# Patient Record
Sex: FEMALE | Race: WHITE | NOT HISPANIC OR LATINO | Employment: FULL TIME | URBAN - METROPOLITAN AREA
[De-identification: names, ages, dates, MRNs, and addresses within clinical notes are randomized per-mention and may not be internally consistent; named-entity substitution may affect disease eponyms.]

---

## 2018-11-07 ENCOUNTER — ANNUAL EXAM (OUTPATIENT)
Dept: OBGYN CLINIC | Facility: CLINIC | Age: 48
End: 2018-11-07
Payer: COMMERCIAL

## 2018-11-07 VITALS
HEIGHT: 65 IN | DIASTOLIC BLOOD PRESSURE: 70 MMHG | BODY MASS INDEX: 24.16 KG/M2 | SYSTOLIC BLOOD PRESSURE: 118 MMHG | WEIGHT: 145 LBS

## 2018-11-07 DIAGNOSIS — Z12.31 ENCOUNTER FOR SCREENING MAMMOGRAM FOR MALIGNANT NEOPLASM OF BREAST: ICD-10-CM

## 2018-11-07 DIAGNOSIS — Z01.419 ENCNTR FOR GYN EXAM (GENERAL) (ROUTINE) W/O ABN FINDINGS: ICD-10-CM

## 2018-11-07 PROCEDURE — 99396 PREV VISIT EST AGE 40-64: CPT | Performed by: PHYSICIAN ASSISTANT

## 2018-11-07 PROCEDURE — 87624 HPV HI-RISK TYP POOLED RSLT: CPT | Performed by: PHYSICIAN ASSISTANT

## 2018-11-07 PROCEDURE — G0145 SCR C/V CYTO,THINLAYER,RESCR: HCPCS | Performed by: PHYSICIAN ASSISTANT

## 2018-11-07 RX ORDER — VALACYCLOVIR HYDROCHLORIDE 1 G/1
TABLET, FILM COATED ORAL
COMMUNITY
Start: 2013-12-17 | End: 2018-11-26 | Stop reason: SDUPTHER

## 2018-11-07 NOTE — PROGRESS NOTES
Yessenia Common  1970      CC:  Yearly exam    S:  50 y o  female here for yearly exam  Her cycles are typically regular, last 4 days where she will have a day of spotting, then 2 days of bleeding where she will saturate a super tampon in 8 hours, then another day of spotting and then stop  She had a normal menstrual period in September, then had a heavy 7 day bleed 2 weeks later where she saturated a super tampon in 3 hours  She has had no bleeding since  Last Pap 2012 neg/neg  Last Mammo 2/15/16 neg      Current Outpatient Prescriptions:     valACYclovir (VALTREX) 1,000 mg tablet, Take by mouth, Disp: , Rfl:   Social History     Social History    Marital status: /Civil Union     Spouse name: N/A    Number of children: N/A    Years of education: N/A     Occupational History    Not on file  Social History Main Topics    Smoking status: Current Every Day Smoker    Smokeless tobacco: Never Used    Alcohol use Yes      Comment: socially     Drug use: No    Sexual activity: Yes     Partners: Male     Birth control/ protection: Male Sterilization     Other Topics Concern    Not on file     Social History Narrative    No narrative on file     Family History   Problem Relation Age of Onset    Thyroid disease Mother     Autoimmune disease Mother     No Known Problems Father     Colon cancer Maternal Grandfather     Breast cancer Paternal Grandmother       Past Medical History:   Diagnosis Date    Miscarriage     X2    Varicella         O:  Blood pressure 118/70, height 5' 5" (1 651 m), weight 65 8 kg (145 lb), last menstrual period 10/08/2018  Patient appears well and is not in distress  Neck is supple without masses  Breasts are symmetrical without mass, tenderness, nipple discharge, skin changes or adenopathy  Abdomen is soft and nontender without masses  External genitals are normal without lesions or rashes    Vagina is normal with spotting noted - likely early menses  Cervix is normal without discharge or lesion  Uterus is normal, mobile, nontender without palpable mass  Possible 2cm fibroid? Adnexa are normal, nontender, without palpable mass  A:  Yearly exam  Irregular cycle x 1     P:   Pap and HPV today    Mammo slip provided    Observe cycles - if she continues to have bleeds q 2    weeks will check pelvic US, TSH, CBC and    EMB   RTO one year for yearly exam or sooner as needed

## 2018-11-09 LAB
HPV HR 12 DNA CVX QL NAA+PROBE: NEGATIVE
HPV16 DNA CVX QL NAA+PROBE: NEGATIVE
HPV18 DNA CVX QL NAA+PROBE: NEGATIVE

## 2018-11-12 LAB
LAB AP GYN PRIMARY INTERPRETATION: NORMAL
Lab: NORMAL

## 2018-11-26 DIAGNOSIS — B00.9 HERPES: Primary | ICD-10-CM

## 2018-11-26 RX ORDER — VALACYCLOVIR HYDROCHLORIDE 1 G/1
TABLET, FILM COATED ORAL
Qty: 90 TABLET | Refills: 2 | Status: SHIPPED | OUTPATIENT
Start: 2018-11-26 | End: 2019-11-26

## 2021-04-08 DIAGNOSIS — Z23 ENCOUNTER FOR IMMUNIZATION: ICD-10-CM
